# Patient Record
Sex: MALE | URBAN - METROPOLITAN AREA
[De-identification: names, ages, dates, MRNs, and addresses within clinical notes are randomized per-mention and may not be internally consistent; named-entity substitution may affect disease eponyms.]

---

## 2020-06-29 ENCOUNTER — APPOINTMENT (OUTPATIENT)
Dept: URBAN - METROPOLITAN AREA CLINIC 186 | Age: 30
Setting detail: DERMATOLOGY
End: 2020-06-30

## 2020-06-29 DIAGNOSIS — L84 CORNS AND CALLOSITIES: ICD-10-CM

## 2020-06-29 PROBLEM — M21.612 BUNION OF LEFT FOOT: Status: ACTIVE | Noted: 2020-06-29

## 2020-06-29 PROCEDURE — OTHER COUNSELING: OTHER

## 2020-06-29 PROCEDURE — OTHER TREATMENT REGIMEN: OTHER

## 2020-06-29 PROCEDURE — 99201: CPT

## 2020-06-29 ASSESSMENT — LOCATION ZONE DERM
LOCATION ZONE: HAND
LOCATION ZONE: TOE
LOCATION ZONE: FINGER

## 2020-06-29 ASSESSMENT — LOCATION DETAILED DESCRIPTION DERM
LOCATION DETAILED: RIGHT PROXIMAL PALMAR MIDDLE FINGER
LOCATION DETAILED: LEFT PROXIMAL PALMAR MIDDLE FINGER
LOCATION DETAILED: RIGHT PROXIMAL PALMAR RING FINGER
LOCATION DETAILED: LEFT MEDIAL PLANTAR 5TH TOE
LOCATION DETAILED: RIGHT ULNAR PALM
LOCATION DETAILED: RIGHT RADIAL PALM
LOCATION DETAILED: LEFT RADIAL PALM
LOCATION DETAILED: LEFT DISTAL PALMAR RING FINGER

## 2020-06-29 ASSESSMENT — LOCATION SIMPLE DESCRIPTION DERM
LOCATION SIMPLE: RIGHT MIDDLE FINGER
LOCATION SIMPLE: LEFT HAND
LOCATION SIMPLE: LEFT MIDDLE FINGER
LOCATION SIMPLE: RIGHT HAND
LOCATION SIMPLE: RIGHT RING FINGER
LOCATION SIMPLE: PLANTAR SURFACE OF LEFT 5TH TOE
LOCATION SIMPLE: LEFT RING FINGER

## 2020-06-29 NOTE — PROCEDURE: TREATMENT REGIMEN
Plan: Referral to foot and ankle - Dr. Slater\\n\\[facility]\\[address]\\[place] [address]\\[phone] Plan: Referral to foot and ankle - Dr. Slater\\n\\[facility]\\[address]\\[place] [address]\\[phone]

## 2020-06-29 NOTE — PROCEDURE: TREATMENT REGIMEN
Initiate Treatment: Over the counter mediplast, apply to callus nightly, change when it falls off.  When becomes smaller pain will reside.